# Patient Record
Sex: MALE | Race: ASIAN | NOT HISPANIC OR LATINO
[De-identification: names, ages, dates, MRNs, and addresses within clinical notes are randomized per-mention and may not be internally consistent; named-entity substitution may affect disease eponyms.]

---

## 2020-06-16 ENCOUNTER — ERX REFILL RESPONSE (OUTPATIENT)
Age: 75
End: 2020-06-16

## 2020-06-16 RX ORDER — TENOFOVIR ALAFENAMIDE 25 MG/1
TAKE 1 TABLET BY MOUTH EVERY DAY TABLET ORAL
Qty: 90 | Refills: 0

## 2020-07-31 ENCOUNTER — OFFICE VISIT (OUTPATIENT)
Dept: URBAN - METROPOLITAN AREA TELEHEALTH 2 | Facility: TELEHEALTH | Age: 75
End: 2020-07-31

## 2020-08-10 ENCOUNTER — LAB OUTSIDE AN ENCOUNTER (OUTPATIENT)
Dept: URBAN - METROPOLITAN AREA CLINIC 78 | Facility: CLINIC | Age: 75
End: 2020-08-10

## 2020-08-10 ENCOUNTER — OFFICE VISIT (OUTPATIENT)
Dept: URBAN - METROPOLITAN AREA CLINIC 78 | Facility: CLINIC | Age: 75
End: 2020-08-10
Payer: MEDICARE

## 2020-08-10 DIAGNOSIS — N18.9 CHRONIC RENAL DISEASE: ICD-10-CM

## 2020-08-10 DIAGNOSIS — B18.1 CHRONIC HEPATITIS B: ICD-10-CM

## 2020-08-10 DIAGNOSIS — K82.4 GALLBLADDER POLYP: ICD-10-CM

## 2020-08-10 PROCEDURE — 99214 OFFICE O/P EST MOD 30 MIN: CPT | Performed by: INTERNAL MEDICINE

## 2020-08-10 RX ORDER — TENOFOVIR ALAFENAMIDE 25 MG/1
TAKE 1 TABLET BY MOUTH EVERY DAY TABLET ORAL
Qty: 90 | Refills: 0

## 2020-08-10 RX ORDER — TAMSULOSIN HYDROCHLORIDE 0.4 MG/1
TAKE 1 CAPSULE (0.4 MG) BY ORAL ROUTE ONCE DAILY 1/2 HOUR FOLLOWING THE SAME MEAL EACH DAY CAPSULE ORAL 1
Qty: 0 | Refills: 0 | Status: ON HOLD | COMMUNITY
Start: 1900-01-01

## 2020-08-10 RX ORDER — TENOFOVIR ALAFENAMIDE 25 MG/1
TAKE 1 TABLET BY MOUTH EVERY DAY TABLET ORAL
Qty: 90 | Refills: 0 | Status: ON HOLD | COMMUNITY

## 2020-08-10 NOTE — HPI-TODAY'S VISIT:
Patient seen in a followup visit  He is concerned about his hand , some small discoloration and wants to now if Vemlidy is the cause  Tolerate it well  This visit he is not accompanied  his son but another adult  Last visit was a Tele visit  Overall he is doing well  Weight is stable    Colonoscopy in the chart is from 2012 by Dr. Robbie Mccabe with recommendation to repeat in 10 years  EGD with path 3/17 reviewed , takes Omeprazole  Abdominal US reviewed 9 /19 : Benign hepatic cyst ,galll bladder polyp 3 mm  ( unchanged in size )  Abd US 3/13/19 :  Known Hepatic cysts stable , GB polyp unchanges  Summarizing :Unclear what end point for treatment used. Patient reportedly seroconverted HBe Ag to Ab as per Dr Mccabe notes who was treating previously Given higher risk pt and HBs AG (+) status will continue therapy

## 2020-08-11 ENCOUNTER — TELEPHONE ENCOUNTER (OUTPATIENT)
Dept: URBAN - METROPOLITAN AREA CLINIC 92 | Facility: CLINIC | Age: 75
End: 2020-08-11

## 2020-08-12 ENCOUNTER — LAB OUTSIDE AN ENCOUNTER (OUTPATIENT)
Dept: URBAN - METROPOLITAN AREA CLINIC 78 | Facility: CLINIC | Age: 75
End: 2020-08-12

## 2020-08-12 LAB
A/G RATIO: 2
AFP, SERUM, TUMOR MARKER: 1.1
ALBUMIN: 4.5
ALKALINE PHOSPHATASE: 111
ALT (SGPT): 15
AST (SGOT): 16
BASO (ABSOLUTE): 0
BASOS: 0
BILIRUBIN, TOTAL: 0.5
BUN/CREATININE RATIO: 13
BUN: 18
CALCIUM: 9.6
CARBON DIOXIDE, TOTAL: 23
CHLORIDE: 102
CREATININE: 1.38
EGFR IF AFRICN AM: 58
EGFR IF NONAFRICN AM: 50
EOS (ABSOLUTE): 0.2
EOS: 4
GLOBULIN, TOTAL: 2.3
GLUCOSE: 89
HBSAG SCREEN: POSITIVE
HBV GENOTYPE: (no result)
HBV IU/ML: (no result)
HEMATOCRIT: 42.5
HEMATOLOGY COMMENTS:: (no result)
HEMOGLOBIN: 13.5
HEP B CORE AB, IGM: NEGATIVE
HEP B CORE AB, TOT: POSITIVE
HEP B SURFACE AB, QUAL: NON REACTIVE
HEP BE AB: POSITIVE
HEP BE AG: NEGATIVE
IMMATURE CELLS: (no result)
IMMATURE GRANS (ABS): 0
IMMATURE GRANULOCYTES: 0
LOG10 HBV AS IU/ML: (no result)
LYMPHS (ABSOLUTE): 1.4
LYMPHS: 29
MCH: 31.2
MCHC: 31.8
MCV: 98
MONOCYTES(ABSOLUTE): 0.4
MONOCYTES: 8
NEUTROPHILS (ABSOLUTE): 2.8
NEUTROPHILS: 59
NRBC: (no result)
PLATELETS: 181
POTASSIUM: 3.8
PROTEIN, TOTAL: 6.8
RBC: 4.33
RDW: 11.8
SODIUM: 140
TEST INFORMATION:: (no result)
WBC: 4.8

## 2020-12-18 ENCOUNTER — ERX REFILL RESPONSE (OUTPATIENT)
Dept: URBAN - METROPOLITAN AREA CLINIC 78 | Facility: CLINIC | Age: 75
End: 2020-12-18

## 2020-12-18 RX ORDER — TENOFOVIR ALAFENAMIDE 25 MG/1
TAKE 1 TABLET BY MOUTH EVERY DAY TABLET ORAL
Qty: 90 | Refills: 0

## 2021-01-25 ENCOUNTER — OFFICE VISIT (OUTPATIENT)
Dept: URBAN - METROPOLITAN AREA CLINIC 78 | Facility: CLINIC | Age: 76
End: 2021-01-25
Payer: MEDICARE

## 2021-01-25 VITALS
RESPIRATION RATE: 16 BRPM | HEIGHT: 63 IN | DIASTOLIC BLOOD PRESSURE: 86 MMHG | SYSTOLIC BLOOD PRESSURE: 134 MMHG | TEMPERATURE: 97.8 F | WEIGHT: 100.6 LBS | BODY MASS INDEX: 17.82 KG/M2 | HEART RATE: 74 BPM

## 2021-01-25 DIAGNOSIS — B18.1 CHRONIC HEPATITIS B: ICD-10-CM

## 2021-01-25 DIAGNOSIS — N18.9 CHRONIC RENAL DISEASE: ICD-10-CM

## 2021-01-25 DIAGNOSIS — K82.4 GALLBLADDER POLYP: ICD-10-CM

## 2021-01-25 PROCEDURE — 99213 OFFICE O/P EST LOW 20 MIN: CPT | Performed by: INTERNAL MEDICINE

## 2021-01-25 RX ORDER — TENOFOVIR ALAFENAMIDE 25 MG/1
TAKE 1 TABLET BY MOUTH EVERY DAY TABLET ORAL
Qty: 90 | Refills: 0 | Status: ACTIVE | COMMUNITY

## 2021-01-25 RX ORDER — TAMSULOSIN HYDROCHLORIDE 0.4 MG/1
TAKE 1 CAPSULE (0.4 MG) BY ORAL ROUTE ONCE DAILY 1/2 HOUR FOLLOWING THE SAME MEAL EACH DAY CAPSULE ORAL 1
Qty: 0 | Refills: 0 | Status: ON HOLD | COMMUNITY
Start: 1900-01-01

## 2021-01-25 RX ORDER — TENOFOVIR ALAFENAMIDE 25 MG/1
TAKE 1 TABLET BY MOUTH EVERY DAY TABLET ORAL
OUTPATIENT

## 2021-01-25 NOTE — HPI-TODAY'S VISIT:
Patient seen in a follow-up visit ?: Patient accompanied by son Jaspreet  Doing welll  Apetite is ok  Weight gain little  Sleep is ok  Patient has not scheduled US for unclear reasons    Labs 8/20  Hep B e Ab (+)  Hep B c Ab positibve (T)  Hep B c IgM neg  Hep B s Ag neg      Colonoscopy in the chart is from 2012 by Dr. Robbie Mccabe with recommendation to repeat in 10 years EGD with path 3/17 reviewed , takes Omeprazole  Abdominal US reviewed 9 /19 : Benign hepatic cyst ,galll bladder polyp 3 mm  ( unchanged in size )  Abd US 3/13/19 :  Known Hepatic cysts stable , GB polyp unchanges  Summarizing :Unclear what end point for treatment used. Patient reportedly seroconverted HBe Ag to Ab as per Dr Mccabe notes who was treating previously Given higher risk pt and HBs AG (+) status will continue therapy

## 2021-02-01 ENCOUNTER — LAB OUTSIDE AN ENCOUNTER (OUTPATIENT)
Dept: URBAN - METROPOLITAN AREA CLINIC 78 | Facility: CLINIC | Age: 76
End: 2021-02-01

## 2021-02-17 ENCOUNTER — OFFICE VISIT (OUTPATIENT)
Dept: URBAN - METROPOLITAN AREA CLINIC 77 | Facility: CLINIC | Age: 76
End: 2021-02-17
Payer: MEDICARE

## 2021-02-17 DIAGNOSIS — K82.4 GALLBLADDER POLYP: ICD-10-CM

## 2021-02-17 DIAGNOSIS — K76.89 LIVER CYSTS: ICD-10-CM

## 2021-02-17 PROCEDURE — 76705 ECHO EXAM OF ABDOMEN: CPT | Performed by: INTERNAL MEDICINE

## 2021-02-17 RX ORDER — TAMSULOSIN HYDROCHLORIDE 0.4 MG/1
TAKE 1 CAPSULE (0.4 MG) BY ORAL ROUTE ONCE DAILY 1/2 HOUR FOLLOWING THE SAME MEAL EACH DAY CAPSULE ORAL 1
Qty: 0 | Refills: 0 | Status: ON HOLD | COMMUNITY
Start: 1900-01-01

## 2021-02-19 LAB
A/G RATIO: 1.8
AFP, SERUM, TUMOR MARKER: 1.2
ALBUMIN: 4.2
ALKALINE PHOSPHATASE: 110
ALT (SGPT): 17
AST (SGOT): 18
BILIRUBIN, TOTAL: 0.4
BUN/CREATININE RATIO: 13
BUN: 18
CALCIUM: 9.1
CARBON DIOXIDE, TOTAL: 25
CHLORIDE: 104
CREATININE: 1.38
EGFR IF AFRICN AM: 58
EGFR IF NONAFRICN AM: 50
GLOBULIN, TOTAL: 2.4
GLUCOSE: 100
HBSAG CONFIRMATION: POSITIVE
HBSAG SCREEN: (no result)
POTASSIUM: 4.3
PROTEIN, TOTAL: 6.6
SODIUM: 140

## 2021-03-22 ENCOUNTER — LAB OUTSIDE AN ENCOUNTER (OUTPATIENT)
Dept: URBAN - METROPOLITAN AREA CLINIC 78 | Facility: CLINIC | Age: 76
End: 2021-03-22

## 2021-03-22 ENCOUNTER — OFFICE VISIT (OUTPATIENT)
Dept: URBAN - METROPOLITAN AREA CLINIC 78 | Facility: CLINIC | Age: 76
End: 2021-03-22
Payer: MEDICARE

## 2021-03-22 VITALS
WEIGHT: 102.2 LBS | HEART RATE: 77 BPM | RESPIRATION RATE: 16 BRPM | BODY MASS INDEX: 18.11 KG/M2 | HEIGHT: 63 IN | SYSTOLIC BLOOD PRESSURE: 105 MMHG | DIASTOLIC BLOOD PRESSURE: 62 MMHG | TEMPERATURE: 99.2 F

## 2021-03-22 DIAGNOSIS — B18.1 CHRONIC HEPATITIS B: ICD-10-CM

## 2021-03-22 DIAGNOSIS — K82.4 GALLBLADDER POLYP: ICD-10-CM

## 2021-03-22 DIAGNOSIS — N18.9 CHRONIC RENAL DISEASE: ICD-10-CM

## 2021-03-22 PROBLEM — 709044004 CHRONIC RENAL DISEASE: Status: ACTIVE | Noted: 2021-03-22

## 2021-03-22 PROCEDURE — 99213 OFFICE O/P EST LOW 20 MIN: CPT | Performed by: INTERNAL MEDICINE

## 2021-03-22 RX ORDER — TAMSULOSIN HYDROCHLORIDE 0.4 MG/1
TAKE 1 CAPSULE (0.4 MG) BY ORAL ROUTE ONCE DAILY 1/2 HOUR FOLLOWING THE SAME MEAL EACH DAY CAPSULE ORAL 1
Qty: 0 | Refills: 0 | Status: ACTIVE | COMMUNITY
Start: 1900-01-01

## 2021-03-22 NOTE — HPI-TODAY'S VISIT:
Patient seen in a follow-up visit  Patient accompanied by son Jaspreet  Doing well  Appetite is ok  Weight gain little  Sleep is ok  He had questions about resuming Viread    Labs 8/20  Hep B e Ab (+)  Hep B c Ab positive (T)  Hep B c IgM neg  Hep B s Ag neg      Colonoscopy in the chart is from 2012 by Dr. Robbie Mccabe with recommendation to repeat in 10 years EGD with path 3/17 reviewed, takes Omeprazole  Abdominal US reviewed 2/21 :  Benign hepatic cyst, all bladder polyp 3 mm  (unchanged)  Abd US 3/13/19 :  Known Hepatic cysts stable, GB polyp unchanged  Summarizing :Unclear what end point for treatment used. Patient reportedly seroconverted HBe Ag to Ab as per Dr Mccabe notes who was treating previously Given higher risk pt and HBs AG (+) status will continue therapy

## 2021-04-02 ENCOUNTER — TELEPHONE ENCOUNTER (OUTPATIENT)
Dept: URBAN - METROPOLITAN AREA CLINIC 78 | Facility: CLINIC | Age: 76
End: 2021-04-02

## 2021-04-02 RX ORDER — TENOFOVIR DISOPROXIL FUMARATE 300 MG/1
TAKE 1 TABLET (300 MG) BY ORAL ROUTE ONCE DAILY WITH A MEAL FOR 90 DAYS TABLET ORAL 1
Qty: 90 | Refills: 1 | OUTPATIENT
Start: 2021-04-02

## 2021-04-28 ENCOUNTER — TELEPHONE ENCOUNTER (OUTPATIENT)
Dept: URBAN - METROPOLITAN AREA CLINIC 78 | Facility: CLINIC | Age: 76
End: 2021-04-28

## 2021-05-12 ENCOUNTER — OFFICE VISIT (OUTPATIENT)
Dept: URBAN - METROPOLITAN AREA CLINIC 77 | Facility: CLINIC | Age: 76
End: 2021-05-12
Payer: MEDICARE

## 2021-05-12 DIAGNOSIS — K82.4 GALLBLADDER POLYP: ICD-10-CM

## 2021-05-12 DIAGNOSIS — K76.89 LIVER CYST: ICD-10-CM

## 2021-05-12 DIAGNOSIS — B18.2 CARRIER OF VIRAL HEPATITIS C: ICD-10-CM

## 2021-05-12 PROCEDURE — 76705 ECHO EXAM OF ABDOMEN: CPT | Performed by: INTERNAL MEDICINE

## 2021-05-12 RX ORDER — TENOFOVIR DISOPROXIL FUMARATE 300 MG/1
TAKE 1 TABLET (300 MG) BY ORAL ROUTE ONCE DAILY WITH A MEAL FOR 90 DAYS TABLET ORAL 1
Qty: 90 | Refills: 1 | Status: ACTIVE | COMMUNITY
Start: 2021-04-02

## 2021-05-12 RX ORDER — TAMSULOSIN HYDROCHLORIDE 0.4 MG/1
TAKE 1 CAPSULE (0.4 MG) BY ORAL ROUTE ONCE DAILY 1/2 HOUR FOLLOWING THE SAME MEAL EACH DAY CAPSULE ORAL 1
Qty: 0 | Refills: 0 | Status: ACTIVE | COMMUNITY
Start: 1900-01-01

## 2021-05-16 LAB
AFP, SERUM, TUMOR MARKER: 1.4
HBV LOG10: (no result)
HEPATITIS B QUANTITATION: (no result)
TEST INFORMATION:: (no result)

## 2021-07-19 ENCOUNTER — OFFICE VISIT (OUTPATIENT)
Dept: URBAN - METROPOLITAN AREA CLINIC 78 | Facility: CLINIC | Age: 76
End: 2021-07-19
Payer: MEDICARE

## 2021-07-19 VITALS
HEART RATE: 62 BPM | HEIGHT: 63 IN | WEIGHT: 96.2 LBS | BODY MASS INDEX: 17.05 KG/M2 | DIASTOLIC BLOOD PRESSURE: 83 MMHG | TEMPERATURE: 98.4 F | SYSTOLIC BLOOD PRESSURE: 133 MMHG

## 2021-07-19 DIAGNOSIS — B18.1 CHRONIC HEPATITIS B: ICD-10-CM

## 2021-07-19 DIAGNOSIS — K82.4 GALLBLADDER POLYP: ICD-10-CM

## 2021-07-19 DIAGNOSIS — Z12.11 SCREEN FOR COLON CANCER: ICD-10-CM

## 2021-07-19 DIAGNOSIS — N28.9 RENAL INSUFFICIENCY: ICD-10-CM

## 2021-07-19 PROCEDURE — 99213 OFFICE O/P EST LOW 20 MIN: CPT | Performed by: INTERNAL MEDICINE

## 2021-07-19 RX ORDER — TENOFOVIR DISOPROXIL FUMARATE 300 MG/1
TAKE 1 TABLET (300 MG) BY ORAL ROUTE ONCE DAILY WITH A MEAL FOR 90 DAYS TABLET ORAL 1
Qty: 90 | Refills: 1 | Status: ACTIVE | COMMUNITY
Start: 2021-04-02

## 2021-07-19 RX ORDER — TAMSULOSIN HYDROCHLORIDE 0.4 MG/1
TAKE 1 CAPSULE (0.4 MG) BY ORAL ROUTE ONCE DAILY 1/2 HOUR FOLLOWING THE SAME MEAL EACH DAY CAPSULE ORAL 1
Qty: 0 | Refills: 0 | Status: ACTIVE | COMMUNITY
Start: 1900-01-01

## 2021-07-19 NOTE — HPI-TODAY'S VISIT:
Patient seen in a follow-up visit  Patient accompanied by son Jaspreet  Doing well  Appetite is ok  Weight gain lstable Sleep is ok  He is on Tenofivir    Labs 5/21 HBV quant is undetectible  AFP 1.4   Previous labs 8/20 Hep B e Ab (+)  Hep B c Ab positive (T)  Hep B c IgM neg  Hep B s Ag neg   RUQ US 5/21  STable GB polyps and Hepatic cyts   Renal insufficiency under care of Dr Faust    Colonoscopy in the chart is from 2012 by Dr. Robbie Mccabe with recommendation to repeat in 10 years EGD with path 3/17 reviewed, takes Omeprazole  Abdominal US reviewed 2/21 :  Benign hepatic cyst, all bladder polyp 3 mm  (unchanged)  Abd US 3/13/19 :  Known Hepatic cysts stable, GB polyp unchanged  Summarizing :Unclear what end point for treatment used. Patient reportedly seroconverted HBe Ag to Ab as per Dr Mccabe notes who was treating previously Given higher risk pt and HBs AG (+) status will continue therapy

## 2021-10-07 ENCOUNTER — TELEPHONE ENCOUNTER (OUTPATIENT)
Dept: URBAN - METROPOLITAN AREA CLINIC 78 | Facility: CLINIC | Age: 76
End: 2021-10-07

## 2021-10-07 RX ORDER — TENOFOVIR DISOPROXIL FUMARATE 300 MG/1
TAKE 1 TABLET (300 MG) BY ORAL ROUTE ONCE DAILY WITH A MEAL FOR 90 DAYS TABLET ORAL 1
Qty: 90 | Refills: 1
Start: 2021-04-02

## 2021-11-15 ENCOUNTER — LAB OUTSIDE AN ENCOUNTER (OUTPATIENT)
Dept: URBAN - METROPOLITAN AREA CLINIC 78 | Facility: CLINIC | Age: 76
End: 2021-11-15

## 2021-11-16 ENCOUNTER — LAB OUTSIDE AN ENCOUNTER (OUTPATIENT)
Dept: URBAN - METROPOLITAN AREA CLINIC 78 | Facility: CLINIC | Age: 76
End: 2021-11-16

## 2021-11-16 LAB
AFP, SERUM, TUMOR MARKER: 1.9
HEPATITIS B SURF AB QUANT: <3.1

## 2022-01-15 ENCOUNTER — LAB OUTSIDE AN ENCOUNTER (OUTPATIENT)
Dept: URBAN - METROPOLITAN AREA CLINIC 78 | Facility: CLINIC | Age: 77
End: 2022-01-15

## 2022-01-19 ENCOUNTER — OFFICE VISIT (OUTPATIENT)
Dept: URBAN - METROPOLITAN AREA CLINIC 77 | Facility: CLINIC | Age: 77
End: 2022-01-19
Payer: MEDICARE

## 2022-01-19 ENCOUNTER — LAB OUTSIDE AN ENCOUNTER (OUTPATIENT)
Dept: URBAN - METROPOLITAN AREA CLINIC 78 | Facility: CLINIC | Age: 77
End: 2022-01-19

## 2022-01-19 DIAGNOSIS — B18.1 CARRIER OF HEPATITIS B: ICD-10-CM

## 2022-01-19 DIAGNOSIS — K82.4 GALLBLADDER POLYP: ICD-10-CM

## 2022-01-19 DIAGNOSIS — K76.89 HEPATIC CYST: ICD-10-CM

## 2022-01-19 PROCEDURE — 76705 ECHO EXAM OF ABDOMEN: CPT | Performed by: INTERNAL MEDICINE

## 2022-01-19 RX ORDER — TAMSULOSIN HYDROCHLORIDE 0.4 MG/1
TAKE 1 CAPSULE (0.4 MG) BY ORAL ROUTE ONCE DAILY 1/2 HOUR FOLLOWING THE SAME MEAL EACH DAY CAPSULE ORAL 1
Qty: 0 | Refills: 0 | Status: ACTIVE | COMMUNITY
Start: 1900-01-01

## 2022-01-19 RX ORDER — TENOFOVIR DISOPROXIL FUMARATE 300 MG/1
TAKE 1 TABLET (300 MG) BY ORAL ROUTE ONCE DAILY WITH A MEAL FOR 90 DAYS TABLET ORAL 1
Qty: 90 | Refills: 1 | Status: ACTIVE | COMMUNITY
Start: 2021-04-02

## 2022-01-20 LAB — HEPATITIS B SURF AB QUANT: <3.1

## 2022-01-26 ENCOUNTER — OFFICE VISIT (OUTPATIENT)
Dept: URBAN - METROPOLITAN AREA CLINIC 78 | Facility: CLINIC | Age: 77
End: 2022-01-26
Payer: MEDICARE

## 2022-01-26 VITALS
WEIGHT: 92.4 LBS | DIASTOLIC BLOOD PRESSURE: 84 MMHG | HEIGHT: 63 IN | RESPIRATION RATE: 16 BRPM | BODY MASS INDEX: 16.37 KG/M2 | TEMPERATURE: 98.1 F | SYSTOLIC BLOOD PRESSURE: 127 MMHG | HEART RATE: 71 BPM

## 2022-01-26 DIAGNOSIS — K82.4 GALLBLADDER POLYP: ICD-10-CM

## 2022-01-26 DIAGNOSIS — Z12.11 SCREEN FOR COLON CANCER: ICD-10-CM

## 2022-01-26 DIAGNOSIS — N28.9 RENAL INSUFFICIENCY: ICD-10-CM

## 2022-01-26 DIAGNOSIS — B18.1 CHRONIC HEPATITIS B: ICD-10-CM

## 2022-01-26 DIAGNOSIS — R63.4 WEIGHT LOSS: ICD-10-CM

## 2022-01-26 PROCEDURE — 99214 OFFICE O/P EST MOD 30 MIN: CPT | Performed by: INTERNAL MEDICINE

## 2022-01-26 RX ORDER — SODIUM PICOSULFATE, MAGNESIUM OXIDE, AND ANHYDROUS CITRIC ACID 10; 3.5; 12 MG/160ML; G/160ML; G/160ML
160 ML LIQUID ORAL
Qty: 320 MILLILITER | Refills: 0 | OUTPATIENT
Start: 2022-01-26 | End: 2022-01-27

## 2022-01-26 RX ORDER — TENOFOVIR DISOPROXIL FUMARATE 300 MG/1
TAKE 1 TABLET (300 MG) BY ORAL ROUTE ONCE DAILY WITH A MEAL FOR 90 DAYS TABLET ORAL 1
Qty: 90 | Refills: 1 | Status: ACTIVE | COMMUNITY
Start: 2021-04-02

## 2022-01-26 RX ORDER — TAMSULOSIN HYDROCHLORIDE 0.4 MG/1
TAKE 1 CAPSULE (0.4 MG) BY ORAL ROUTE ONCE DAILY 1/2 HOUR FOLLOWING THE SAME MEAL EACH DAY CAPSULE ORAL 1
Qty: 0 | Refills: 0 | Status: ACTIVE | COMMUNITY
Start: 1900-01-01

## 2022-01-26 NOTE — HPI-TODAY'S VISIT:
Patient seen in a follow-up visit  Patient accompanied by son Jaspreet  Doing well  Appetite is ok  Weight loss of few pounds since last visit  He is on Tenofivir  Labs 1/22 :  Hep B s AB undetectible  Hep B PCR not done   Labs from 5/21  HBV quant negative Hep B s Ag positive   RUQ US Jan 2022: Stable hepatic cyst  No lesions noted    PREVIOUS ENCOUNTER: Labs 5/21  HBV quant is undetectible  AFP 1.4   Previous labs 8/20 Hep B e Ag (-)  Hep B e Ab (+)  Hep B c Ab positive (T)  Hep B c IgM neg  Hep B s Ag positive  VL undetectible   RUQ US 5/21  STable GB polyps and Hepatic cyts   Renal insufficiency under care of Dr Faust    Colonoscopy in the chart is from 2012 by Dr. Robbie Mccabe with recommendation to repeat in 10 years EGD with path 3/17 reviewed, takes Omeprazole  Abdominal US reviewed 2/21 :  Benign hepatic cyst, all bladder polyp 3 mm  (unchanged)  Abd US 3/13/19 :  Known Hepatic cysts stable, GB polyp unchanged  Summarizing :Unclear what end point for treatment used. Patient reportedly seroconverted HBe Ag to Ab as per Dr Mccabe notes who was treating previously Given higher risk pt and HBs AG (+) status will continue therapy

## 2022-02-16 ENCOUNTER — LAB OUTSIDE AN ENCOUNTER (OUTPATIENT)
Dept: URBAN - METROPOLITAN AREA CLINIC 78 | Facility: CLINIC | Age: 77
End: 2022-02-16

## 2022-02-17 LAB — AFP, SERUM, TUMOR MARKER: 1.3

## 2022-02-24 ENCOUNTER — OFFICE VISIT (OUTPATIENT)
Dept: URBAN - METROPOLITAN AREA SURGERY CENTER 15 | Facility: SURGERY CENTER | Age: 77
End: 2022-02-24
Payer: MEDICARE

## 2022-02-24 DIAGNOSIS — Z12.11 AVERAGE RISK FOR CRC. DUE TO PT'S CO-MORBID STATE WITH END STAGE DEMENTIA, HIGH RISK FOR ANESTHESIA (PER NEUROLOGY); INABILITY TO TAKE A BOWEL PREP....WOULD NOT ADVISE ANY COLORECTAL CANCER SCREENING INCLUDING STOOL TEST FOR FECAL BLOOD.: ICD-10-CM

## 2022-02-24 PROCEDURE — G0121 COLON CA SCRN NOT HI RSK IND: HCPCS | Performed by: INTERNAL MEDICINE

## 2022-02-24 PROCEDURE — G8907 PT DOC NO EVENTS ON DISCHARG: HCPCS | Performed by: INTERNAL MEDICINE

## 2022-04-18 ENCOUNTER — TELEPHONE ENCOUNTER (OUTPATIENT)
Dept: URBAN - METROPOLITAN AREA CLINIC 78 | Facility: CLINIC | Age: 77
End: 2022-04-18

## 2022-04-18 RX ORDER — TENOFOVIR DISOPROXIL FUMARATE 300 MG/1
TAKE 1 TABLET (300 MG) BY ORAL ROUTE ONCE DAILY WITH A MEAL FOR 90 DAYS TABLET ORAL 1
Qty: 90 | Refills: 1
Start: 2021-04-02

## 2022-07-14 ENCOUNTER — LAB OUTSIDE AN ENCOUNTER (OUTPATIENT)
Dept: URBAN - METROPOLITAN AREA CLINIC 78 | Facility: CLINIC | Age: 77
End: 2022-07-14

## 2022-08-05 ENCOUNTER — OFFICE VISIT (OUTPATIENT)
Dept: URBAN - METROPOLITAN AREA CLINIC 78 | Facility: CLINIC | Age: 77
End: 2022-08-05
Payer: MEDICARE

## 2022-08-05 VITALS
HEIGHT: 63 IN | SYSTOLIC BLOOD PRESSURE: 130 MMHG | TEMPERATURE: 98.4 F | RESPIRATION RATE: 16 BRPM | DIASTOLIC BLOOD PRESSURE: 77 MMHG | HEART RATE: 73 BPM | WEIGHT: 90.4 LBS | BODY MASS INDEX: 16.02 KG/M2

## 2022-08-05 DIAGNOSIS — R63.4 WEIGHT LOSS: ICD-10-CM

## 2022-08-05 DIAGNOSIS — N28.9 RENAL INSUFFICIENCY: ICD-10-CM

## 2022-08-05 DIAGNOSIS — K82.4 GALLBLADDER POLYP: ICD-10-CM

## 2022-08-05 DIAGNOSIS — B18.1 CHRONIC HEPATITIS B: ICD-10-CM

## 2022-08-05 PROCEDURE — 99213 OFFICE O/P EST LOW 20 MIN: CPT | Performed by: INTERNAL MEDICINE

## 2022-08-05 RX ORDER — TENOFOVIR DISOPROXIL FUMARATE 300 MG/1
TAKE 1 TABLET (300 MG) BY ORAL ROUTE ONCE DAILY WITH A MEAL FOR 90 DAYS TABLET ORAL 1
Qty: 90 | Refills: 1 | Status: ACTIVE | COMMUNITY
Start: 2021-04-02

## 2022-08-05 RX ORDER — TENOFOVIR DISOPROXIL FUMARATE 300 MG/1
TAKE 1 TABLET (300 MG) BY ORAL ROUTE ONCE DAILY WITH A MEAL FOR 90 DAYS TABLET ORAL 1
Qty: 90 | Refills: 1
Start: 2021-04-02

## 2022-08-05 RX ORDER — TAMSULOSIN HYDROCHLORIDE 0.4 MG/1
TAKE 1 CAPSULE (0.4 MG) BY ORAL ROUTE ONCE DAILY 1/2 HOUR FOLLOWING THE SAME MEAL EACH DAY CAPSULE ORAL 1
Qty: 0 | Refills: 0 | Status: ACTIVE | COMMUNITY
Start: 1900-01-01

## 2022-08-05 NOTE — HPI-TODAY'S VISIT:
Patient seen in a follow-up visit  Patient accompanied by son Cayetano  today  Doing well  Appetite is ok  Denies nausea/vomiting or dysphagia  Denies bleeding  He reports periodic heartburn , food does not  get stuck .Deneis worseing of his symptoms  He takes Famotidine q 2-3 days  Weight loss of few pounds since last visit  He is on Tenofivir  requesting refill and US   MRI  was declined because of reported metal in left shoulder and left  hip  Denies having CT scan done   Back pain s/p injection and needing  heating pads    Colonoscopy : Normal 2022   EGD 2017 : LA grade B esophagitis  No HP   Barium Swallow 2020 by his ENT : Mild esophageal dysmotility  Mild reflux     Labs 1/22 :  Hep B s AB undetectible  Hep B PCR not done   Labs from 5/21  HBV quant negative Hep B s Ag positive   RUQ US Jan 2022: Stable hepatic cyst  No lesions noted    PREVIOUS ENCOUNTER: Labs 5/21  HBV quant is undetectible  AFP 1.4   Previous labs 8/20 Hep B e Ag (-)  Hep B e Ab (+)  Hep B c Ab positive (T)  Hep B c IgM neg  Hep B s Ag positive  VL undetectible   RUQ US 5/21  STable GB polyps and Hepatic cyts   Renal insufficiency under care of Dr Faust/ Dr Pepe    Historical data : Colonoscopy in the chart is from 2012 by Dr. Robbie Mccabe with recommendation to repeat in 10 years EGD with path 3/17 reviewed, takes Omeprazole  Abdominal US reviewed 2/21 :  Benign hepatic cyst, all bladder polyp 3 mm  (unchanged)  Abd US 3/13/19 :  Known Hepatic cysts stable, GB polyp unchanged  Summarizing :Unclear what end point for treatment used. Patient reportedly seroconverted HBe Ag to Ab as per Dr Mccabe notes who was treating previously Given higher risk pt and HBs AG (+) status will continue therapy

## 2022-08-17 ENCOUNTER — OFFICE VISIT (OUTPATIENT)
Dept: URBAN - METROPOLITAN AREA CLINIC 77 | Facility: CLINIC | Age: 77
End: 2022-08-17
Payer: MEDICARE

## 2022-08-17 DIAGNOSIS — K76.89 HEPATIC CYST: ICD-10-CM

## 2022-08-17 DIAGNOSIS — B18.1 CHRONIC HEPATITIS B: ICD-10-CM

## 2022-08-17 DIAGNOSIS — K82.4 GALLBLADDER POLYP: ICD-10-CM

## 2022-08-17 PROCEDURE — 76700 US EXAM ABDOM COMPLETE: CPT | Performed by: INTERNAL MEDICINE

## 2022-08-17 RX ORDER — TAMSULOSIN HYDROCHLORIDE 0.4 MG/1
TAKE 1 CAPSULE (0.4 MG) BY ORAL ROUTE ONCE DAILY 1/2 HOUR FOLLOWING THE SAME MEAL EACH DAY CAPSULE ORAL 1
Qty: 0 | Refills: 0 | Status: ACTIVE | COMMUNITY
Start: 1900-01-01

## 2022-08-17 RX ORDER — TENOFOVIR DISOPROXIL FUMARATE 300 MG/1
TAKE 1 TABLET (300 MG) BY ORAL ROUTE ONCE DAILY WITH A MEAL FOR 90 DAYS TABLET ORAL 1
Qty: 90 | Refills: 1 | Status: ACTIVE | COMMUNITY
Start: 2021-04-02

## 2022-08-19 PROBLEM — 85057007 HEPATIC CYST: Status: ACTIVE | Noted: 2022-08-19

## 2022-09-21 ENCOUNTER — OFFICE VISIT (OUTPATIENT)
Dept: URBAN - METROPOLITAN AREA CLINIC 78 | Facility: CLINIC | Age: 77
End: 2022-09-21
Payer: MEDICARE

## 2022-09-21 VITALS
WEIGHT: 87.8 LBS | SYSTOLIC BLOOD PRESSURE: 122 MMHG | HEIGHT: 63 IN | TEMPERATURE: 98 F | BODY MASS INDEX: 15.56 KG/M2 | DIASTOLIC BLOOD PRESSURE: 76 MMHG | HEART RATE: 73 BPM

## 2022-09-21 DIAGNOSIS — R79.89 ELEVATED LFTS: ICD-10-CM

## 2022-09-21 DIAGNOSIS — G89.29 OTHER CHRONIC PAIN: ICD-10-CM

## 2022-09-21 DIAGNOSIS — R63.4 WEIGHT LOSS: ICD-10-CM

## 2022-09-21 DIAGNOSIS — M54.9 DORSALGIA, UNSPECIFIED: ICD-10-CM

## 2022-09-21 DIAGNOSIS — B18.1 CHRONIC HEPATITIS B: ICD-10-CM

## 2022-09-21 PROCEDURE — 99213 OFFICE O/P EST LOW 20 MIN: CPT | Performed by: INTERNAL MEDICINE

## 2022-09-21 RX ORDER — TAMSULOSIN HYDROCHLORIDE 0.4 MG/1
TAKE 1 CAPSULE (0.4 MG) BY ORAL ROUTE ONCE DAILY 1/2 HOUR FOLLOWING THE SAME MEAL EACH DAY CAPSULE ORAL 1
Qty: 0 | Refills: 0 | Status: ACTIVE | COMMUNITY
Start: 1900-01-01

## 2022-09-21 RX ORDER — TENOFOVIR DISOPROXIL FUMARATE 300 MG/1
TAKE 1 TABLET (300 MG) BY ORAL ROUTE ONCE DAILY WITH A MEAL FOR 90 DAYS TABLET ORAL 1
Qty: 90 | Refills: 1 | Status: ACTIVE | COMMUNITY
Start: 2021-04-02

## 2022-09-21 RX ORDER — TENOFOVIR DISOPROXIL FUMARATE 300 MG/1
TAKE 1 TABLET (300 MG) BY ORAL ROUTE ONCE DAILY WITH A MEAL FOR 90 DAYS TABLET ORAL 1
Qty: 90 | Refills: 1

## 2022-09-21 NOTE — HPI-TODAY'S VISIT:
Patient seen in a follow-up visit  Patient accompanied by son Cayetano  today  Doing so so  Reports back pain ( had declined surgery ) .Takes tramadol and calcium  Appetite is ok  Denies nausea/vomiting or dysphagia  Denies bleeding  He reports periodic heartburn , food does not  get stuck .Deneis worseing of his symptoms  He takes Famotidine q 2-3 days  Weight loss of few pounds since last visit  He is on Tenofivir  requesting refill and US   MRI  was declined because of reported metal in left shoulder and left  hip  Denies having CT scan done   Back pain s/p injection and needing  heating pads    Colonoscopy : Normal 2022   EGD 2017 : LA grade B esophagitis  No HP   Barium Swallow 2020 by his ENT : Mild esophageal dysmotility  Mild reflux     Labs 1/22 :  Hep B s AB undetectible  Hep B PCR not done   Labs from 5/21  HBV quant negative Hep B s Ag positive   RUQ US Jan 2022: Stable hepatic cyst  No lesions noted    PREVIOUS ENCOUNTER: Labs 5/21  HBV quant is undetectible  AFP 1.4   Previous labs 8/20 Hep B e Ag (-)  Hep B e Ab (+)  Hep B c Ab positive (T)  Hep B c IgM neg  Hep B s Ag positive  VL undetectible   RUQ US 5/21  STable GB polyps and Hepatic cyts   Renal insufficiency under care of Dr Faust/ Dr Pepe    Historical data : Colonoscopy in the chart is from 2012 by Dr. Robbie Mccabe with recommendation to repeat in 10 years EGD with path 3/17 reviewed, takes Omeprazole  Abdominal US reviewed 2/21 :  Benign hepatic cyst, all bladder polyp 3 mm  (unchanged)  Abd US 3/13/19 :  Known Hepatic cysts stable, GB polyp unchanged  Summarizing :Unclear what end point for treatment used. Patient reportedly seroconverted HBe Ag to Ab as per Dr Mccabe notes who was treating previously Given higher risk pt and HBs AG (+) status will continue therapy

## 2022-10-31 ENCOUNTER — LAB OUTSIDE AN ENCOUNTER (OUTPATIENT)
Dept: URBAN - METROPOLITAN AREA CLINIC 78 | Facility: CLINIC | Age: 77
End: 2022-10-31

## 2022-10-31 ENCOUNTER — TELEPHONE ENCOUNTER (OUTPATIENT)
Dept: URBAN - METROPOLITAN AREA CLINIC 78 | Facility: CLINIC | Age: 77
End: 2022-10-31

## 2023-01-10 ENCOUNTER — LAB OUTSIDE AN ENCOUNTER (OUTPATIENT)
Dept: URBAN - METROPOLITAN AREA CLINIC 78 | Facility: CLINIC | Age: 78
End: 2023-01-10

## 2023-01-23 ENCOUNTER — OFFICE VISIT (OUTPATIENT)
Dept: URBAN - METROPOLITAN AREA CLINIC 78 | Facility: CLINIC | Age: 78
End: 2023-01-23
Payer: MEDICARE

## 2023-01-23 VITALS
WEIGHT: 91 LBS | DIASTOLIC BLOOD PRESSURE: 79 MMHG | BODY MASS INDEX: 16.12 KG/M2 | SYSTOLIC BLOOD PRESSURE: 138 MMHG | HEIGHT: 63 IN | TEMPERATURE: 97.9 F | HEART RATE: 71 BPM

## 2023-01-23 DIAGNOSIS — R80.9 PROTEINURIA, UNSPECIFIED TYPE: ICD-10-CM

## 2023-01-23 DIAGNOSIS — G89.29 OTHER CHRONIC PAIN: ICD-10-CM

## 2023-01-23 DIAGNOSIS — B18.1 CHRONIC HEPATITIS B: ICD-10-CM

## 2023-01-23 PROBLEM — 82423001: Status: ACTIVE | Noted: 2022-09-21

## 2023-01-23 PROBLEM — 61977001 CHRONIC TYPE B VIRAL HEPATITIS: Status: ACTIVE | Noted: 2021-03-22

## 2023-01-23 PROCEDURE — 99213 OFFICE O/P EST LOW 20 MIN: CPT | Performed by: INTERNAL MEDICINE

## 2023-01-23 RX ORDER — TENOFOVIR DISOPROXIL FUMARATE 300 MG/1
TAKE 1 TABLET (300 MG) BY ORAL ROUTE ONCE DAILY WITH A MEAL FOR 90 DAYS TABLET ORAL 1
Qty: 90 | Refills: 1 | Status: ACTIVE | COMMUNITY

## 2023-01-23 RX ORDER — TENOFOVIR DISOPROXIL FUMARATE 300 MG/1
TAKE 1 TABLET (300 MG) BY ORAL ROUTE ONCE DAILY WITH A MEAL FOR 90 DAYS TABLET ORAL 1
Qty: 90 | Refills: 1

## 2023-01-23 RX ORDER — TAMSULOSIN HYDROCHLORIDE 0.4 MG/1
TAKE 1 CAPSULE (0.4 MG) BY ORAL ROUTE ONCE DAILY 1/2 HOUR FOLLOWING THE SAME MEAL EACH DAY CAPSULE ORAL 1
Qty: 0 | Refills: 0 | Status: ACTIVE | COMMUNITY
Start: 1900-01-01

## 2023-01-23 NOTE — HPI-TODAY'S VISIT:
Patient seen in a follow-up visit  Patient accompanied by son Cayetano  today  . Patient is up post CT scan of abdomen done without contrast.  It is performed at [facility] dated 10/31/2022 the liver reveals multiple cysts involving all segments of the liver with numerous hypodensities that are too small to fully characterize like to get more cysts evaluation for solid hepatic masses is considerably limited in the absence of intravenous contrast as well as the background of cystic liver lesions stomach is distended with moderate stool burden in the ascending colon partially imaged no bulky lymph nodes no reported Lab dated 9/23/22 :  AFP < 0.9  Hepatitis DNA not detected   HBeAb  Reactive Hepatitis B e Ag Negative  Hep B s Ag  reactive  Previously he declined with contrast  because of kidney proteinuria   He reports metal in his left shoulder and left hip with pain Denies nausea/vomiting or dysphagia  Denies bleeding  He reports periodic heartburn , food does not  get stuck .Deneis worseing of his symptoms  He takes Famotidine q 2-3 days  Weight loss of few pounds since last visit  He is on Tenofivir  requesting refill and US   MRI  was declined because of reported metal in left shoulder and left  hip  Denies having CT scan done   Back pain s/p injection and needing  heating pads    Colonoscopy : Normal 2022   EGD 2017 : LA grade B esophagitis  No HP   Barium Swallow 2020 by his ENT : Mild esophageal dysmotility  Mild reflux     Labs 1/22 :  Hep B s AB undetectible  Hep B PCR not done   Labs from 5/21  HBV quant negative Hep B s Ag positive   RUQ US Jan 2022: Stable hepatic cyst  No lesions noted    PREVIOUS ENCOUNTER: Labs 5/21  HBV quant is undetectible  AFP 1.4   Previous labs 8/20 Hep B e Ag (-)  Hep B e Ab (+)  Hep B c Ab positive (T)  Hep B c IgM neg  Hep B s Ag positive  VL undetectible   RUQ US 5/21  STable GB polyps and Hepatic cyts   Renal insufficiency under care of Dr Faust/ Dr Pepe    Historical data : Colonoscopy in the chart is from 2012 by Dr. Robbie Mccabe with recommendation to repeat in 10 years EGD with path 3/17 reviewed, takes Omeprazole  Abdominal US reviewed 2/21 :  Benign hepatic cyst, all bladder polyp 3 mm  (unchanged)  Abd US 3/13/19 :  Known Hepatic cysts stable, GB polyp unchanged  Summarizing :Unclear what end point for treatment used. Patient reportedly seroconverted HBe Ag to Ab as per Dr Mccabe notes who was treating previously Given higher risk pt and HBs AG (+) status will continue therapy

## 2023-05-02 ENCOUNTER — TELEPHONE ENCOUNTER (OUTPATIENT)
Dept: URBAN - METROPOLITAN AREA CLINIC 78 | Facility: CLINIC | Age: 78
End: 2023-05-02

## 2023-05-02 RX ORDER — TENOFOVIR DISOPROXIL FUMARATE 300 MG/1
TAKE 1 TABLET (300 MG) BY ORAL ROUTE ONCE DAILY WITH A MEAL FOR 90 DAYS TABLET ORAL 1
Qty: 90 | Refills: 3

## 2023-06-26 ENCOUNTER — OFFICE VISIT (OUTPATIENT)
Dept: URBAN - METROPOLITAN AREA CLINIC 78 | Facility: CLINIC | Age: 78
End: 2023-06-26
Payer: MEDICARE

## 2023-06-26 VITALS
BODY MASS INDEX: 16.12 KG/M2 | HEART RATE: 77 BPM | SYSTOLIC BLOOD PRESSURE: 108 MMHG | RESPIRATION RATE: 16 BRPM | TEMPERATURE: 98.1 F | DIASTOLIC BLOOD PRESSURE: 72 MMHG | WEIGHT: 91 LBS | HEIGHT: 63 IN

## 2023-06-26 DIAGNOSIS — R80.9 PROTEINURIA, UNSPECIFIED: ICD-10-CM

## 2023-06-26 DIAGNOSIS — B18.1 CHRONIC HEPATITIS B: ICD-10-CM

## 2023-06-26 DIAGNOSIS — N18.9 CHRONIC RENAL INSUFFICIENCY: ICD-10-CM

## 2023-06-26 PROCEDURE — 99213 OFFICE O/P EST LOW 20 MIN: CPT | Performed by: INTERNAL MEDICINE

## 2023-06-26 RX ORDER — TENOFOVIR DISOPROXIL FUMARATE 300 MG/1
TAKE 1 TABLET (300 MG) BY ORAL ROUTE ONCE DAILY WITH A MEAL FOR 90 DAYS TABLET ORAL 1
Qty: 90 | Refills: 3 | Status: ACTIVE | COMMUNITY

## 2023-06-26 RX ORDER — TAMSULOSIN HYDROCHLORIDE 0.4 MG/1
TAKE 1 CAPSULE (0.4 MG) BY ORAL ROUTE ONCE DAILY 1/2 HOUR FOLLOWING THE SAME MEAL EACH DAY CAPSULE ORAL 1
Qty: 0 | Refills: 0 | Status: ACTIVE | COMMUNITY
Start: 1900-01-01

## 2023-06-26 NOTE — HPI-TODAY'S VISIT:
Patient seen in a follow-up visit  Patient accompanied by donna Monteiro    Labs 5/11/2023 ordered by Dr Donald Brandt  KWASI negative Hepatitis B surface antigen reactive Hepatitis C antibody nonreactive Creatinine 1.60 Calcium 8.2 Phosphate 2.0 Uric acid 1.9 Total protein 6.0 Kappa light chains 32.9 and lambda light chains 29.4 on electrophoresis Urine protein to creatinine ratio is 635 with a total protein of random urine at 40  Nephrologist initiated him on Farxiga 10 mg  Lowered his BP meds  Has been encouraged to eat more dairy products  He has not asked patient to d/c tenofivir    Patient is up post CT scan of abdomen done without contrast.  It is performed at [facility] dated 10/31/2022 the liver reveals multiple cysts involving all segments of the liver with numerous hypodensities that are too small to fully characterize like to get more cysts evaluation for solid hepatic masses is considerably limited in the absence of intravenous contrast as well as the background of cystic liver lesions stomach is distended with moderate stool burden in the ascending colon partially imaged no bulky lymph nodes no reported Lab dated 9/23/22 :  AFP < 0.9  Hepatitis DNA not detected   HBeAb  Reactive Hepatitis B e Ag Negative  Hep B s Ag  reactive  Previously he declined with contrast  because of kidney proteinuria   He reports metal in his left shoulder and left hip with pain Denies nausea/vomiting or dysphagia  Denies bleeding  He reports periodic heartburn , food does not  get stuck .Deneis worseing of his symptoms  He takes Famotidine q 2-3 days  Weight loss of few pounds since last visit  He is on Tenofivir  requesting refill and US   MRI  was declined because of reported metal in left shoulder and left  hip  Denies having CT scan done   Back pain s/p injection and needing  heating pads    Colonoscopy : Normal 2022   EGD 2017 : LA grade B esophagitis  No HP   Barium Swallow 2020 by his ENT : Mild esophageal dysmotility  Mild reflux     Labs 1/22 :  Hep B s AB undetectible  Hep B PCR not done   Labs from 5/21  HBV quant negative Hep B s Ag positive   RUQ US Jan 2022: Stable hepatic cyst  No lesions noted    PREVIOUS ENCOUNTER: Labs 5/21  HBV quant is undetectible  AFP 1.4   Previous labs 8/20 Hep B e Ag (-)  Hep B e Ab (+)  Hep B c Ab positive (T)  Hep B c IgM neg  Hep B s Ag positive  VL undetectible   RUQ US 5/21  STable GB polyps and Hepatic cyts   Renal insufficiency under care of Dr Faust/ Dr Pepe    Historical data : Colonoscopy in the chart is from 2012 by Dr. Robbie Mccabe with recommendation to repeat in 10 years EGD with path 3/17 reviewed, takes Omeprazole  Abdominal US reviewed 2/21 :  Benign hepatic cyst, all bladder polyp 3 mm  (unchanged)  Abd US 3/13/19 :  Known Hepatic cysts stable, GB polyp unchanged  Summarizing :Unclear what end point for treatment used. Patient reportedly seroconverted HBe Ag to Ab as per Dr Mccabe notes who was treating previously Given higher risk pt and HBs AG (+) status will continue therapy

## 2023-07-26 ENCOUNTER — LAB OUTSIDE AN ENCOUNTER (OUTPATIENT)
Dept: URBAN - METROPOLITAN AREA CLINIC 78 | Facility: CLINIC | Age: 78
End: 2023-07-26

## 2023-07-26 LAB
CREATININE POC: 1.5
PERFORMING LAB: (no result)

## 2023-07-28 LAB
HBV IU/ML: (no result)
LOG10 HBV IU/ML: (no result)
TEST INFORMATION:: (no result)

## 2023-08-18 ENCOUNTER — OFFICE VISIT (OUTPATIENT)
Dept: URBAN - METROPOLITAN AREA CLINIC 78 | Facility: CLINIC | Age: 78
End: 2023-08-18

## 2023-08-23 ENCOUNTER — OFFICE VISIT (OUTPATIENT)
Dept: URBAN - METROPOLITAN AREA CLINIC 78 | Facility: CLINIC | Age: 78
End: 2023-08-23
Payer: MEDICARE

## 2023-08-23 VITALS
HEART RATE: 72 BPM | HEIGHT: 63 IN | RESPIRATION RATE: 16 BRPM | SYSTOLIC BLOOD PRESSURE: 120 MMHG | BODY MASS INDEX: 15.88 KG/M2 | WEIGHT: 89.6 LBS | TEMPERATURE: 98.1 F | DIASTOLIC BLOOD PRESSURE: 69 MMHG

## 2023-08-23 DIAGNOSIS — R80.9 PROTEINURIA, UNSPECIFIED: ICD-10-CM

## 2023-08-23 DIAGNOSIS — N18.9 CHRONIC RENAL INSUFFICIENCY: ICD-10-CM

## 2023-08-23 DIAGNOSIS — B18.1 CHRONIC HEPATITIS B: ICD-10-CM

## 2023-08-23 DIAGNOSIS — R93.89 ABNORMAL CT SCAN: ICD-10-CM

## 2023-08-23 PROBLEM — 129679001: Status: ACTIVE | Noted: 2023-08-23

## 2023-08-23 PROCEDURE — 99213 OFFICE O/P EST LOW 20 MIN: CPT | Performed by: INTERNAL MEDICINE

## 2023-08-23 RX ORDER — TAMSULOSIN HYDROCHLORIDE 0.4 MG/1
TAKE 1 CAPSULE (0.4 MG) BY ORAL ROUTE ONCE DAILY 1/2 HOUR FOLLOWING THE SAME MEAL EACH DAY CAPSULE ORAL 1
Qty: 0 | Refills: 0 | Status: ACTIVE | COMMUNITY
Start: 1900-01-01

## 2023-08-23 RX ORDER — TENOFOVIR DISOPROXIL FUMARATE 300 MG/1
TAKE 1 TABLET (300 MG) BY ORAL ROUTE ONCE DAILY WITH A MEAL FOR 90 DAYS TABLET ORAL 1
Qty: 90 | Refills: 3 | Status: ACTIVE | COMMUNITY

## 2023-08-23 NOTE — HPI-TODAY'S VISIT:
Patient seen in a follow-up visit  Patient accompanied by son Cayetano   Patient is seen in post CT scan dated 7/26/2023:  1.  Innumerable hepatic cysts noted no liver masses or neoplasm 2.  Large amount of foodstuffs within the distal stomach with question of circumferential thickening of the distal stomach a partial obstruction at the antropyloric junction cannot be excluded correlate with any clinical symptoms of gastric outlet obstruction Patient is accompanied by the son who states that his last meal prior to the CT scan was nearly 18 hours ago.  He is complaining of a lot of heartburn.  Denies nausea vomiting he has some trouble swallowing Weight is stable.   PREVIOUS ENCOUNTER;  Labs 5/11/2023 ordered by Dr Donald Brandt  KWASI negative Hepatitis B surface antigen reactive Hepatitis C antibody nonreactive Creatinine 1.60 Calcium 8.2 Phosphate 2.0 Uric acid 1.9 Total protein 6.0 Kappa light chains 32.9 and lambda light chains 29.4 on electrophoresis Urine protein to creatinine ratio is 635 with a total protein of random urine at 40  Nephrologist initiated him on Farxiga 10 mg  Lowered his BP meds  Has been encouraged to eat more dairy products  He has not asked patient to d/c tenofivir    Patient is up post CT scan of abdomen done without contrast.  It is performed at [facility] dated 10/31/2022 the liver reveals multiple cysts involving all segments of the liver with numerous hypodensities that are too small to fully characterize like to get more cysts evaluation for solid hepatic masses is considerably limited in the absence of intravenous contrast as well as the background of cystic liver lesions stomach is distended with moderate stool burden in the ascending colon partially imaged no bulky lymph nodes no reported Lab dated 9/23/22 :  AFP < 0.9  Hepatitis DNA not detected   HBeAb  Reactive Hepatitis B e Ag Negative  Hep B s Ag  reactive  Previously he declined with contrast  because of kidney proteinuria   He reports metal in his left shoulder and left hip with pain Denies nausea/vomiting or dysphagia  Denies bleeding  He reports periodic heartburn , food does not  get stuck .Deneis worseing of his symptoms  He takes Famotidine q 2-3 days  Weight loss of few pounds since last visit  He is on Tenofivir  requesting refill and US   MRI  was declined because of reported metal in left shoulder and left  hip  Denies having CT scan done   Back pain s/p injection and needing  heating pads    Colonoscopy : Normal 2022   EGD 2017 : LA grade B esophagitis  No HP   Barium Swallow 2020 by his ENT : Mild esophageal dysmotility  Mild reflux     Labs 1/22 :  Hep B s AB undetectible  Hep B PCR not done   Labs from 5/21  HBV quant negative Hep B s Ag positive   RUQ US Jan 2022: Stable hepatic cyst  No lesions noted    PREVIOUS ENCOUNTER: Labs 5/21  HBV quant is undetectible  AFP 1.4   Previous labs 8/20 Hep B e Ag (-)  Hep B e Ab (+)  Hep B c Ab positive (T)  Hep B c IgM neg  Hep B s Ag positive  VL undetectible   RUQ US 5/21  STable GB polyps and Hepatic cyts   Renal insufficiency under care of Dr Faust/ Dr Pepe    Historical data : Colonoscopy in the chart is from 2012 by Dr. Robbie Mccabe with recommendation to repeat in 10 years EGD with path 3/17 reviewed, takes Omeprazole  Abdominal US reviewed 2/21 :  Benign hepatic cyst, all bladder polyp 3 mm  (unchanged)  Abd US 3/13/19 :  Known Hepatic cysts stable, GB polyp unchanged  Summarizing :Unclear what end point for treatment used. Patient reportedly seroconverted HBe Ag to Ab as per Dr Mccabe notes who was treating previously Given higher risk pt and HBs AG (+) status will continue therapy

## 2023-08-30 ENCOUNTER — P2P PATIENT RECORD (OUTPATIENT)
Age: 78
End: 2023-08-30

## 2023-09-06 ENCOUNTER — OFFICE VISIT (OUTPATIENT)
Dept: URBAN - METROPOLITAN AREA MEDICAL CENTER 10 | Facility: MEDICAL CENTER | Age: 78
End: 2023-09-06
Payer: MEDICARE

## 2023-09-06 DIAGNOSIS — K22.4 CORKSCREW ESOPHAGUS: ICD-10-CM

## 2023-09-06 DIAGNOSIS — R93.3 ABN FINDINGS-GI TRACT: ICD-10-CM

## 2023-09-06 DIAGNOSIS — K29.60 ADENOPAPILLOMATOSIS GASTRICA: ICD-10-CM

## 2023-09-06 PROCEDURE — 43239 EGD BIOPSY SINGLE/MULTIPLE: CPT | Performed by: INTERNAL MEDICINE

## 2023-09-11 ENCOUNTER — OFFICE VISIT (OUTPATIENT)
Dept: URBAN - METROPOLITAN AREA CLINIC 78 | Facility: CLINIC | Age: 78
End: 2023-09-11

## 2024-01-22 ENCOUNTER — DASHBOARD ENCOUNTERS (OUTPATIENT)
Age: 79
End: 2024-01-22

## 2024-01-22 ENCOUNTER — OFFICE VISIT (OUTPATIENT)
Dept: URBAN - METROPOLITAN AREA CLINIC 78 | Facility: CLINIC | Age: 79
End: 2024-01-22
Payer: MEDICARE

## 2024-01-22 VITALS
RESPIRATION RATE: 14 BRPM | SYSTOLIC BLOOD PRESSURE: 128 MMHG | HEART RATE: 81 BPM | BODY MASS INDEX: 17.08 KG/M2 | HEIGHT: 63 IN | TEMPERATURE: 98.1 F | WEIGHT: 96.4 LBS | DIASTOLIC BLOOD PRESSURE: 80 MMHG

## 2024-01-22 DIAGNOSIS — B18.1 CHRONIC HEPATITIS B: ICD-10-CM

## 2024-01-22 DIAGNOSIS — R80.9 PROTEINURIA, UNSPECIFIED: ICD-10-CM

## 2024-01-22 DIAGNOSIS — R12 HEARTBURN SYMPTOM: ICD-10-CM

## 2024-01-22 DIAGNOSIS — N18.9 CHRONIC RENAL INSUFFICIENCY: ICD-10-CM

## 2024-01-22 DIAGNOSIS — K22.4 SPASM OF ESOPHAGUS: ICD-10-CM

## 2024-01-22 PROBLEM — 266434009: Status: ACTIVE | Noted: 2024-01-22

## 2024-01-22 PROCEDURE — 99213 OFFICE O/P EST LOW 20 MIN: CPT | Performed by: INTERNAL MEDICINE

## 2024-01-22 RX ORDER — TENOFOVIR DISOPROXIL FUMARATE 300 MG/1
TAKE 1 TABLET (300 MG) BY ORAL ROUTE ONCE DAILY WITH A MEAL FOR 90 DAYS TABLET ORAL 1
Qty: 90 | Refills: 3 | Status: ACTIVE | COMMUNITY

## 2024-01-22 RX ORDER — TAMSULOSIN HYDROCHLORIDE 0.4 MG/1
TAKE 1 CAPSULE (0.4 MG) BY ORAL ROUTE ONCE DAILY 1/2 HOUR FOLLOWING THE SAME MEAL EACH DAY CAPSULE ORAL 1
Qty: 0 | Refills: 0 | Status: ACTIVE | COMMUNITY
Start: 1900-01-01

## 2024-01-22 NOTE — HPI-TODAY'S VISIT:
Patient seen in a follow-up visit  Patient accompanied by son Cayetano    Patient reports heartburn and dysphagia post prandial   s/p EGD with path 9/6/23  takes Omeprazole after meals in am and Famotidine in evening   Weight is stable  Compliant with Tenofivir   He is on Losartan , started for his kidneys    PREVIOUS ENCOUNTER:  Patient is seen in post CT scan dated 7/26/2023:  1.  Innumerable hepatic cysts noted no liver masses or neoplasm 2.  Large amount of foodstuffs within the distal stomach with question of circumferential thickening of the distal stomach a partial obstruction at the antropyloric junction cannot be excluded correlate with any clinical symptoms of gastric outlet obstruction Patient is accompanied by the son who states that his last meal prior to the CT scan was nearly 18 hours ago.  He is complaining of a lot of heartburn.  Denies nausea vomiting he has some trouble swallowing Weight is stable.   PREVIOUS ENCOUNTER;  Labs 5/11/2023 ordered by Dr Donald Brandt  KWASI negative Hepatitis B surface antigen reactive Hepatitis C antibody nonreactive Creatinine 1.60 Calcium 8.2 Phosphate 2.0 Uric acid 1.9 Total protein 6.0 Kappa light chains 32.9 and lambda light chains 29.4 on electrophoresis Urine protein to creatinine ratio is 635 with a total protein of random urine at 40  Nephrologist initiated him on Farxiga 10 mg  Lowered his BP meds  Has been encouraged to eat more dairy products  He has not asked patient to d/c tenofivir    Patient is up post CT scan of abdomen done without contrast.  It is performed at [facility] dated 10/31/2022 the liver reveals multiple cysts involving all segments of the liver with numerous hypodensities that are too small to fully characterize like to get more cysts evaluation for solid hepatic masses is considerably limited in the absence of intravenous contrast as well as the background of cystic liver lesions stomach is distended with moderate stool burden in the ascending colon partially imaged no bulky lymph nodes no reported Lab dated 9/23/22 :  AFP < 0.9  Hepatitis DNA not detected   HBeAb  Reactive Hepatitis B e Ag Negative  Hep B s Ag  reactive  Previously he declined with contrast  because of kidney proteinuria   He reports metal in his left shoulder and left hip with pain Denies nausea/vomiting or dysphagia  Denies bleeding  He reports periodic heartburn , food does not  get stuck .Deneis worseing of his symptoms  He takes Famotidine q 2-3 days  Weight loss of few pounds since last visit  He is on Tenofivir  requesting refill and US   MRI  was declined because of reported metal in left shoulder and left  hip  Denies having CT scan done   Back pain s/p injection and needing  heating pads    Colonoscopy : Normal 2022   EGD 2017 : LA grade B esophagitis  No HP   Barium Swallow 2020 by his ENT : Mild esophageal dysmotility  Mild reflux     Labs 1/22 :  Hep B s AB undetectible  Hep B PCR not done   Labs from 5/21  HBV quant negative Hep B s Ag positive   RUQ US Jan 2022: Stable hepatic cyst  No lesions noted    PREVIOUS ENCOUNTER: Labs 5/21  HBV quant is undetectible  AFP 1.4   Previous labs 8/20 Hep B e Ag (-)  Hep B e Ab (+)  Hep B c Ab positive (T)  Hep B c IgM neg  Hep B s Ag positive  VL undetectible   RUQ US 5/21  STable GB polyps and Hepatic cyts   Renal insufficiency under care of Dr Faust/ Dr Pepe    Historical data : Colonoscopy in the chart is from 2012 by Dr. Robbie Mccabe with recommendation to repeat in 10 years EGD with path 3/17 reviewed, takes Omeprazole  Abdominal US reviewed 2/21 :  Benign hepatic cyst, all bladder polyp 3 mm  (unchanged)  Abd US 3/13/19 :  Known Hepatic cysts stable, GB polyp unchanged  Summarizing :Unclear what end point for treatment used. Patient reportedly seroconverted HBe Ag to Ab as per Dr Mccabe notes who was treating previously Given higher risk pt and HBs AG (+) status will continue therapy

## 2024-01-25 ENCOUNTER — P2P PATIENT RECORD (OUTPATIENT)
Age: 79
End: 2024-01-25

## 2024-01-26 LAB
HBV LOG10: NOT DETECTED
HEPATITIS B SURFACE ANTIGEN: REACTIVE
HEPATITIS B VIRUS DNA: NOT DETECTED

## 2024-01-30 ENCOUNTER — P2P PATIENT RECORD (OUTPATIENT)
Age: 79
End: 2024-01-30

## 2024-05-20 ENCOUNTER — P2P PATIENT RECORD (OUTPATIENT)
Age: 79
End: 2024-05-20

## 2024-07-22 ENCOUNTER — OFFICE VISIT (OUTPATIENT)
Dept: URBAN - METROPOLITAN AREA CLINIC 78 | Facility: CLINIC | Age: 79
End: 2024-07-22
Payer: MEDICARE

## 2024-07-22 VITALS
TEMPERATURE: 98.1 F | WEIGHT: 90 LBS | HEIGHT: 63 IN | HEART RATE: 60 BPM | RESPIRATION RATE: 16 BRPM | DIASTOLIC BLOOD PRESSURE: 79 MMHG | BODY MASS INDEX: 15.95 KG/M2 | SYSTOLIC BLOOD PRESSURE: 116 MMHG

## 2024-07-22 DIAGNOSIS — N18.9 CHRONIC RENAL INSUFFICIENCY: ICD-10-CM

## 2024-07-22 DIAGNOSIS — R80.9 PROTEINURIA, UNSPECIFIED: ICD-10-CM

## 2024-07-22 DIAGNOSIS — R12 HEARTBURN SYMPTOM: ICD-10-CM

## 2024-07-22 DIAGNOSIS — B18.1 CHRONIC HEPATITIS B: ICD-10-CM

## 2024-07-22 PROCEDURE — 99213 OFFICE O/P EST LOW 20 MIN: CPT | Performed by: INTERNAL MEDICINE

## 2024-07-22 RX ORDER — TENOFOVIR DISOPROXIL FUMARATE 300 MG/1
TAKE 1 TABLET (300 MG) BY ORAL ROUTE ONCE DAILY WITH A MEAL FOR 90 DAYS TABLET ORAL 1
Qty: 90 | Refills: 3

## 2024-07-22 RX ORDER — TENOFOVIR DISOPROXIL FUMARATE 300 MG/1
TAKE 1 TABLET (300 MG) BY ORAL ROUTE ONCE DAILY WITH A MEAL FOR 90 DAYS TABLET ORAL 1
Qty: 90 | Refills: 3 | Status: ACTIVE | COMMUNITY

## 2024-07-22 RX ORDER — DAPAGLIFLOZIN 10 MG/1
1 TABLET TABLET, FILM COATED ORAL ONCE A DAY
Status: ACTIVE | COMMUNITY

## 2024-07-22 RX ORDER — TAMSULOSIN HYDROCHLORIDE 0.4 MG/1
TAKE 1 CAPSULE (0.4 MG) BY ORAL ROUTE ONCE DAILY 1/2 HOUR FOLLOWING THE SAME MEAL EACH DAY CAPSULE ORAL 1
Qty: 0 | Refills: 0 | Status: ACTIVE | COMMUNITY
Start: 1900-01-01

## 2024-07-22 NOTE — HPI-TODAY'S VISIT:
Patient seen in a follow-up visit  Patient accompanied by son Cayetano    Patient is doing better occassional heartburn otherwise eating better  BM are okay  Weight loss has stabilized  Compliant with Tenofivir  VL is undetectible  Last time he saw his Nephrologist was 2 months ago     s/p EGD with path 9/6/23  takes Omeprazole after meals in am and Famotidine in evening     He is on Losartan , started for his kidneys    PREVIOUS ENCOUNTER:  Patient is seen in post CT scan dated 7/26/2023:  1.  Innumerable hepatic cysts noted no liver masses or neoplasm 2.  Large amount of foodstuffs within the distal stomach with question of circumferential thickening of the distal stomach a partial obstruction at the antropyloric junction cannot be excluded correlate with any clinical symptoms of gastric outlet obstruction Patient is accompanied by the son who states that his last meal prior to the CT scan was nearly 18 hours ago.  He is complaining of a lot of heartburn.  Denies nausea vomiting he has some trouble swallowing Weight is stable.   PREVIOUS ENCOUNTER;  Labs 5/11/2023 ordered by Dr Donald Brandt  KWASI negative Hepatitis B surface antigen reactive Hepatitis C antibody nonreactive Creatinine 1.60 Calcium 8.2 Phosphate 2.0 Uric acid 1.9 Total protein 6.0 Kappa light chains 32.9 and lambda light chains 29.4 on electrophoresis Urine protein to creatinine ratio is 635 with a total protein of random urine at 40  Nephrologist initiated him on Farxiga 10 mg  Lowered his BP meds  Has been encouraged to eat more dairy products  He has not asked patient to d/c tenofivir    Patient is up post CT scan of abdomen done without contrast.  It is performed at [facility] dated 10/31/2022 the liver reveals multiple cysts involving all segments of the liver with numerous hypodensities that are too small to fully characterize like to get more cysts evaluation for solid hepatic masses is considerably limited in the absence of intravenous contrast as well as the background of cystic liver lesions stomach is distended with moderate stool burden in the ascending colon partially imaged no bulky lymph nodes no reported Lab dated 9/23/22 :  AFP < 0.9  Hepatitis DNA not detected   HBeAb  Reactive Hepatitis B e Ag Negative  Hep B s Ag  reactive  Previously he declined with contrast  because of kidney proteinuria   He reports metal in his left shoulder and left hip with pain Denies nausea/vomiting or dysphagia  Denies bleeding  He reports periodic heartburn , food does not  get stuck .Deneis worseing of his symptoms  He takes Famotidine q 2-3 days  Weight loss of few pounds since last visit  He is on Tenofivir  requesting refill and US   MRI  was declined because of reported metal in left shoulder and left  hip  Denies having CT scan done   Back pain s/p injection and needing  heating pads    Colonoscopy : Normal 2022   EGD 2017 : LA grade B esophagitis  No HP   Barium Swallow 2020 by his ENT : Mild esophageal dysmotility  Mild reflux     Labs 1/22 :  Hep B s AB undetectible  Hep B PCR not done   Labs from 5/21  HBV quant negative Hep B s Ag positive   RUQ US Jan 2022: Stable hepatic cyst  No lesions noted    PREVIOUS ENCOUNTER: Labs 5/21  HBV quant is undetectible  AFP 1.4   Previous labs 8/20 Hep B e Ag (-)  Hep B e Ab (+)  Hep B c Ab positive (T)  Hep B c IgM neg  Hep B s Ag positive  VL undetectible   RUQ US 5/21  STable GB polyps and Hepatic cyts   Renal insufficiency under care of Dr Faust/ Dr Pepe    Historical data : Colonoscopy in the chart is from 2012 by Dr. Robbie Mccabe with recommendation to repeat in 10 years EGD with path 3/17 reviewed, takes Omeprazole  Abdominal US reviewed 2/21 :  Benign hepatic cyst, all bladder polyp 3 mm  (unchanged)  Abd US 3/13/19 :  Known Hepatic cysts stable, GB polyp unchanged  Summarizing :Unclear what end point for treatment used. Patient reportedly seroconverted HBe Ag to Ab as per Dr Mccabe notes who was treating previously Given higher risk pt and HBs AG (+) status will continue therapy

## 2024-08-28 ENCOUNTER — OFFICE VISIT (OUTPATIENT)
Dept: URBAN - METROPOLITAN AREA CLINIC 77 | Facility: CLINIC | Age: 79
End: 2024-08-28
Payer: COMMERCIAL

## 2024-08-28 DIAGNOSIS — K76.89 LIVER CYST: ICD-10-CM

## 2024-08-28 DIAGNOSIS — K82.4 GALLBLADDER POLYP: ICD-10-CM

## 2024-08-28 PROCEDURE — 76705 ECHO EXAM OF ABDOMEN: CPT | Performed by: INTERNAL MEDICINE

## 2024-09-17 ENCOUNTER — OFFICE VISIT (OUTPATIENT)
Dept: URBAN - METROPOLITAN AREA SURGERY CENTER 15 | Facility: SURGERY CENTER | Age: 79
End: 2024-09-17

## 2024-09-19 ENCOUNTER — P2P PATIENT RECORD (OUTPATIENT)
Age: 79
End: 2024-09-19

## 2024-10-09 ENCOUNTER — OFFICE VISIT (OUTPATIENT)
Dept: URBAN - METROPOLITAN AREA CLINIC 78 | Facility: CLINIC | Age: 79
End: 2024-10-09
Payer: COMMERCIAL

## 2024-10-09 VITALS
HEIGHT: 63 IN | RESPIRATION RATE: 14 BRPM | TEMPERATURE: 98.1 F | SYSTOLIC BLOOD PRESSURE: 121 MMHG | BODY MASS INDEX: 15.66 KG/M2 | WEIGHT: 88.4 LBS | DIASTOLIC BLOOD PRESSURE: 78 MMHG | HEART RATE: 68 BPM

## 2024-10-09 DIAGNOSIS — R10.13 DYSPEPSIA: ICD-10-CM

## 2024-10-09 DIAGNOSIS — R13.19 ESOPHAGEAL DYSPHAGIA: ICD-10-CM

## 2024-10-09 DIAGNOSIS — K82.4 GALLBLADDER POLYP: ICD-10-CM

## 2024-10-09 DIAGNOSIS — B18.1 CHRONIC HEPATITIS B: ICD-10-CM

## 2024-10-09 DIAGNOSIS — N18.9 CHRONIC RENAL DISEASE: ICD-10-CM

## 2024-10-09 PROCEDURE — 99214 OFFICE O/P EST MOD 30 MIN: CPT | Performed by: INTERNAL MEDICINE

## 2024-10-09 RX ORDER — TENOFOVIR DISOPROXIL FUMARATE 300 MG/1
TAKE 1 TABLET (300 MG) BY ORAL ROUTE ONCE DAILY WITH A MEAL FOR 90 DAYS TABLET ORAL 1
Qty: 90 | Refills: 3 | Status: ACTIVE | COMMUNITY

## 2024-10-09 RX ORDER — DAPAGLIFLOZIN 10 MG/1
1 TABLET TABLET, FILM COATED ORAL ONCE A DAY
Status: ACTIVE | COMMUNITY

## 2024-10-09 RX ORDER — TAMSULOSIN HYDROCHLORIDE 0.4 MG/1
TAKE 1 CAPSULE (0.4 MG) BY ORAL ROUTE ONCE DAILY 1/2 HOUR FOLLOWING THE SAME MEAL EACH DAY CAPSULE ORAL 1
Qty: 0 | Refills: 0 | Status: ACTIVE | COMMUNITY
Start: 1900-01-01

## 2024-10-09 NOTE — HPI-TODAY'S VISIT:
Patient seen in a follow-up visit  Patient accompanied by son Cayetano    Presenting complaint: Heartburn and difficulty swallowing - History of present illness: - Experiencing frequent heartburn, especially after eating - Difficulty swallowing both solids and liquids, worsening over time - Decreased appetite - No change in diet - Not consuming spicy or rich foods - Currently taking pantoprazole once daily at night, not providing relief -   Past medical history: - Hepatitis B, on tenofovir treatment   Reflux-related inflammation found on endoscopy on 09/06/2023 - Barium swallow study in 2020 showed: - Bone degenerative changes in C5 and C7 - Decreased esophageal peristalsis with extra contractions - No hiatal hernia - Barium tablet passed successfully   - Investigations: - Hepatitis B viral load: Results pending (specimen issue, test not performed) - Ultrasound (August): - No gallstones - 3 mm gallbladder polyp - Liver within normal limits - Largest cyst measuring 6.3 mm - No solid liver lesions - Patent vessels - CT scan (last year): - Confirmed liver cyst - Showed food retention in stomach, suggesting delayed gastric emptying   PREVIOUS ENCOUNTER:  Patient is doing better occassional heartburn otherwise eating better  BM are okay  Weight loss has stabilized  Compliant with Tenofivir  VL is undetectible  Last time he saw his Nephrologist was 2 months ago     s/p EGD with path 9/6/23  takes Omeprazole after meals in am and Famotidine in evening     He is on Losartan , started for his kidneys    PREVIOUS ENCOUNTER:  Patient is seen in post CT scan dated 7/26/2023:  1.  Innumerable hepatic cysts noted no liver masses or neoplasm 2.  Large amount of foodstuffs within the distal stomach with question of circumferential thickening of the distal stomach a partial obstruction at the antropyloric junction cannot be excluded correlate with any clinical symptoms of gastric outlet obstruction Patient is accompanied by the son who states that his last meal prior to the CT scan was nearly 18 hours ago.  He is complaining of a lot of heartburn.  Denies nausea vomiting he has some trouble swallowing Weight is stable.   PREVIOUS ENCOUNTER;  Labs 5/11/2023 ordered by Dr Donald Brandt  KWASI negative Hepatitis B surface antigen reactive Hepatitis C antibody nonreactive Creatinine 1.60 Calcium 8.2 Phosphate 2.0 Uric acid 1.9 Total protein 6.0 Kappa light chains 32.9 and lambda light chains 29.4 on electrophoresis Urine protein to creatinine ratio is 635 with a total protein of random urine at 40  Nephrologist initiated him on Farxiga 10 mg  Lowered his BP meds  Has been encouraged to eat more dairy products  He has not asked patient to d/c tenofivir    Patient is up post CT scan of abdomen done without contrast.  It is performed at [facility] dated 10/31/2022 the liver reveals multiple cysts involving all segments of the liver with numerous hypodensities that are too small to fully characterize like to get more cysts evaluation for solid hepatic masses is considerably limited in the absence of intravenous contrast as well as the background of cystic liver lesions stomach is distended with moderate stool burden in the ascending colon partially imaged no bulky lymph nodes no reported Lab dated 9/23/22 :  AFP < 0.9  Hepatitis DNA not detected   HBeAb  Reactive Hepatitis B e Ag Negative  Hep B s Ag  reactive  Previously he declined with contrast  because of kidney proteinuria   He reports metal in his left shoulder and left hip with pain Denies nausea/vomiting or dysphagia  Denies bleeding  He reports periodic heartburn , food does not  get stuck .Deneis worseing of his symptoms  He takes Famotidine q 2-3 days  Weight loss of few pounds since last visit  He is on Tenofivir  requesting refill and US   MRI  was declined because of reported metal in left shoulder and left  hip  Denies having CT scan done   Back pain s/p injection and needing  heating pads    Colonoscopy : Normal 2022   EGD 2017 : LA grade B esophagitis  No HP   Barium Swallow 2020 by his ENT : Mild esophageal dysmotility  Mild reflux     Labs 1/22 :  Hep B s AB undetectible  Hep B PCR not done   Labs from 5/21  HBV quant negative Hep B s Ag positive   RUQ US Jan 2022: Stable hepatic cyst  No lesions noted    PREVIOUS ENCOUNTER: Labs 5/21  HBV quant is undetectible  AFP 1.4   Previous labs 8/20 Hep B e Ag (-)  Hep B e Ab (+)  Hep B c Ab positive (T)  Hep B c IgM neg  Hep B s Ag positive  VL undetectible   RUQ US 5/21  STable GB polyps and Hepatic cyts   Renal insufficiency under care of Dr Faust/ Dr Pepe    Historical data : Colonoscopy in the chart is from 2012 by Dr. Robbie Mccabe with recommendation to repeat in 10 years EGD with path 3/17 reviewed, takes Omeprazole  Abdominal US reviewed 2/21 :  Benign hepatic cyst, all bladder polyp 3 mm  (unchanged)  Abd US 3/13/19 :  Known Hepatic cysts stable, GB polyp unchanged  Summarizing :Unclear what end point for treatment used. Patient reportedly seroconverted HBe Ag to Ab as per Dr Mccabe notes who was treating previously Given higher risk pt and HBs AG (+) status will continue therapy

## 2024-10-15 ENCOUNTER — OFFICE VISIT (OUTPATIENT)
Dept: URBAN - METROPOLITAN AREA SURGERY CENTER 15 | Facility: SURGERY CENTER | Age: 79
End: 2024-10-15
Payer: COMMERCIAL

## 2024-10-15 ENCOUNTER — CLAIMS CREATED FROM THE CLAIM WINDOW (OUTPATIENT)
Dept: URBAN - METROPOLITAN AREA CLINIC 4 | Facility: CLINIC | Age: 79
End: 2024-10-15
Payer: COMMERCIAL

## 2024-10-15 DIAGNOSIS — K22.4 ESOPHAGEAL DYSMOTILITY: ICD-10-CM

## 2024-10-15 DIAGNOSIS — K29.70 GASTRITIS, UNSPECIFIED, WITHOUT BLEEDING: ICD-10-CM

## 2024-10-15 DIAGNOSIS — K31.89 OTHER DISEASES OF STOMACH AND DUODENUM: ICD-10-CM

## 2024-10-15 DIAGNOSIS — K21.9 ACID REFLUX: ICD-10-CM

## 2024-10-15 DIAGNOSIS — K21.9 GASTRO-ESOPHAGEAL REFLUX DISEASE WITHOUT ESOPHAGITIS: ICD-10-CM

## 2024-10-15 DIAGNOSIS — K29.50 ANTRAL GASTRITIS: ICD-10-CM

## 2024-10-15 DIAGNOSIS — R13.19 CERVICAL DYSPHAGIA: ICD-10-CM

## 2024-10-15 PROCEDURE — 88312 SPECIAL STAINS GROUP 1: CPT | Performed by: PATHOLOGY

## 2024-10-15 PROCEDURE — 43249 ESOPH EGD DILATION <30 MM: CPT | Performed by: INTERNAL MEDICINE

## 2024-10-15 PROCEDURE — 00731 ANES UPR GI NDSC PX NOS: CPT | Performed by: NURSE ANESTHETIST, CERTIFIED REGISTERED

## 2024-10-15 PROCEDURE — 43239 EGD BIOPSY SINGLE/MULTIPLE: CPT | Performed by: INTERNAL MEDICINE

## 2024-10-15 PROCEDURE — 88305 TISSUE EXAM BY PATHOLOGIST: CPT | Performed by: PATHOLOGY

## 2025-01-23 ENCOUNTER — P2P PATIENT RECORD (OUTPATIENT)
Age: 80
End: 2025-01-23

## 2025-02-17 ENCOUNTER — OFFICE VISIT (OUTPATIENT)
Dept: URBAN - METROPOLITAN AREA CLINIC 78 | Facility: CLINIC | Age: 80
End: 2025-02-17
Payer: COMMERCIAL

## 2025-02-17 VITALS
HEART RATE: 90 BPM | TEMPERATURE: 98.1 F | BODY MASS INDEX: 16.8 KG/M2 | WEIGHT: 94.8 LBS | SYSTOLIC BLOOD PRESSURE: 89 MMHG | HEIGHT: 63 IN | DIASTOLIC BLOOD PRESSURE: 55 MMHG

## 2025-02-17 DIAGNOSIS — N18.9 CHRONIC RENAL DISEASE: ICD-10-CM

## 2025-02-17 DIAGNOSIS — K82.4 GALLBLADDER POLYP: ICD-10-CM

## 2025-02-17 DIAGNOSIS — B18.1 CHRONIC HEPATITIS B: ICD-10-CM

## 2025-02-17 DIAGNOSIS — D64.9 MILD ANEMIA: ICD-10-CM

## 2025-02-17 DIAGNOSIS — D75.89 MACROCYTOSIS: ICD-10-CM

## 2025-02-17 PROBLEM — 397073000: Status: ACTIVE | Noted: 2025-02-17

## 2025-02-17 PROBLEM — 271737000: Status: ACTIVE | Noted: 2025-02-17

## 2025-02-17 PROCEDURE — 99213 OFFICE O/P EST LOW 20 MIN: CPT | Performed by: INTERNAL MEDICINE

## 2025-02-17 RX ORDER — DAPAGLIFLOZIN 10 MG/1
1 TABLET TABLET, FILM COATED ORAL ONCE A DAY
Status: ACTIVE | COMMUNITY

## 2025-02-17 RX ORDER — TAMSULOSIN HYDROCHLORIDE 0.4 MG/1
TAKE 1 CAPSULE (0.4 MG) BY ORAL ROUTE ONCE DAILY 1/2 HOUR FOLLOWING THE SAME MEAL EACH DAY CAPSULE ORAL 1
Qty: 0 | Refills: 0 | Status: ACTIVE | COMMUNITY
Start: 1900-01-01

## 2025-02-17 RX ORDER — TENOFOVIR DISOPROXIL FUMARATE 300 MG/1
TAKE 1 TABLET (300 MG) BY ORAL ROUTE ONCE DAILY WITH A MEAL FOR 90 DAYS TABLET ORAL 1
Qty: 90 | Refills: 3 | Status: ACTIVE | COMMUNITY

## 2025-02-17 NOTE — HPI-TODAY'S VISIT:
Patient was referred by Valdemar Alvarez A copy of this document will be sent to the physician.     - Presents for review of blood test results - History of chronic hepatitis B, on telomere therapy as approved by nephrology - Unable to undergo MRI due to metal in shoulder Objective: - Weight: Notable recent weight loss observed - Physical examination: Performed respiratory examination Labs (12/2024): Creatinine: 1.53  Glucose: 67 (fasting) - low Hemoglobin: 12.0 - low  Hematocrit: 38.2 MCV: 102  Liver function tests: Within normal limits Imaging: - Ultrasound (08/28/2024): Shows benign cyst and stable 3mm polyp

## 2025-02-24 ENCOUNTER — P2P PATIENT RECORD (OUTPATIENT)
Age: 80
End: 2025-02-24

## 2025-03-07 ENCOUNTER — TELEPHONE ENCOUNTER (OUTPATIENT)
Dept: URBAN - METROPOLITAN AREA CLINIC 78 | Facility: CLINIC | Age: 80
End: 2025-03-07

## 2025-03-08 LAB
AFP, SERUM, TUMOR MARKER: 1.8
ALBUMIN: 4.2
ALKALINE PHOSPHATASE: 75
ALT (SGPT): 16
AST (SGOT): 17
BASO (ABSOLUTE): 0
BASOS: 0
BILIRUBIN, TOTAL: 0.5
BUN/CREATININE RATIO: 19
BUN: 30
CALCIUM: 8.8
CARBON DIOXIDE, TOTAL: 19
CHLORIDE: 105
CREATININE: 1.58
EGFR: 45
EOS (ABSOLUTE): 0
EOS: 0
FERRITIN, SERUM: 341
GLOBULIN, TOTAL: 2.4
GLUCOSE: 80
HEMATOCRIT: 41.1
HEMOGLOBIN: 12.9
HEPATITIS B SURF AB QUANT: <3.5
IMMATURE GRANS (ABS): 0
IMMATURE GRANULOCYTES: 0
LYMPHS (ABSOLUTE): 1.3
LYMPHS: 21
MCH: 32.1
MCHC: 31.4
MCV: 102
MONOCYTES(ABSOLUTE): 0.5
MONOCYTES: 9
NEUTROPHILS (ABSOLUTE): 4.3
NEUTROPHILS: 70
PLATELETS: 196
POTASSIUM: 3.6
PROTEIN, TOTAL: 6.6
RBC: 4.02
RDW: 12.5
SODIUM: 140
WBC: 6.1

## 2025-03-25 ENCOUNTER — OFFICE VISIT (OUTPATIENT)
Dept: URBAN - METROPOLITAN AREA CLINIC 77 | Facility: CLINIC | Age: 80
End: 2025-03-25
Payer: COMMERCIAL

## 2025-03-25 DIAGNOSIS — K82.4 GALLBLADDER POLYP: ICD-10-CM

## 2025-03-25 DIAGNOSIS — K76.9: ICD-10-CM

## 2025-03-25 DIAGNOSIS — B18.1 CHRONIC HEPATITIS B: ICD-10-CM

## 2025-03-25 PROCEDURE — 76705 ECHO EXAM OF ABDOMEN: CPT | Performed by: INTERNAL MEDICINE

## 2025-04-02 ENCOUNTER — OFFICE VISIT (OUTPATIENT)
Dept: URBAN - METROPOLITAN AREA CLINIC 78 | Facility: CLINIC | Age: 80
End: 2025-04-02
Payer: COMMERCIAL

## 2025-04-02 DIAGNOSIS — D75.89 MACROCYTOSIS: ICD-10-CM

## 2025-04-02 DIAGNOSIS — M79.672 LEFT FOOT PAIN: ICD-10-CM

## 2025-04-02 DIAGNOSIS — D64.9 MILD ANEMIA: ICD-10-CM

## 2025-04-02 DIAGNOSIS — B18.1 CHRONIC HEPATITIS B: ICD-10-CM

## 2025-04-02 DIAGNOSIS — N18.9 CHRONIC RENAL DISEASE: ICD-10-CM

## 2025-04-02 DIAGNOSIS — K82.4 GALLBLADDER POLYP: ICD-10-CM

## 2025-04-02 PROCEDURE — 99213 OFFICE O/P EST LOW 20 MIN: CPT | Performed by: INTERNAL MEDICINE

## 2025-04-02 RX ORDER — TAMSULOSIN HYDROCHLORIDE 0.4 MG/1
TAKE 1 CAPSULE (0.4 MG) BY ORAL ROUTE ONCE DAILY 1/2 HOUR FOLLOWING THE SAME MEAL EACH DAY CAPSULE ORAL 1
Qty: 0 | Refills: 0 | Status: ACTIVE | COMMUNITY
Start: 1900-01-01

## 2025-04-02 RX ORDER — TENOFOVIR DISOPROXIL FUMARATE 300 MG/1
TAKE 1 TABLET (300 MG) BY ORAL ROUTE ONCE DAILY WITH A MEAL FOR 90 DAYS TABLET ORAL 1
Qty: 90 | Refills: 3 | Status: ACTIVE | COMMUNITY

## 2025-04-02 RX ORDER — DAPAGLIFLOZIN 10 MG/1
1 TABLET TABLET, FILM COATED ORAL ONCE A DAY
Status: ACTIVE | COMMUNITY

## 2025-04-02 NOTE — HPI-TODAY'S VISIT:
Patient was referred by Dr. Mccloud  A copy of this document will be sent to the physician.   Patient has seen Dr Salgado for left ankle pain and swelling . Reportedly he underwent Doppler US as per son and has also seen Ortho who gave him a shot and diagnosed him with Vit D and calcium deficiency .      - Presents for review of blood test results - History of chronic hepatitis B, Tenofivir  therapy as approved by nephrology - Unable to undergo MRI due to metal in shoulder Objective: - Weight: Notable recent weight loss observed - Physical examination: Performed respiratory examination Labs (12/2024): Creatinine: 1.53  Glucose: 67 (fasting) - low Hemoglobin: 12.0 - low  Hematocrit: 38.2 MCV: 102  Liver function tests: Within normal limits Imaging: - Ultrasound (03/30/25 ): Shows benign cyst and stable 3mm polyp

## 2025-05-06 ENCOUNTER — TELEPHONE ENCOUNTER (OUTPATIENT)
Dept: URBAN - METROPOLITAN AREA CLINIC 78 | Facility: CLINIC | Age: 80
End: 2025-05-06

## 2025-05-06 RX ORDER — TENOFOVIR DISOPROXIL FUMARATE 300 MG/1
TAKE 1 TABLET (300 MG) BY ORAL ROUTE ONCE DAILY WITH A MEAL FOR 90 DAYS TABLET ORAL 1
Qty: 90 | Refills: 3

## 2025-05-08 ENCOUNTER — OFFICE VISIT (OUTPATIENT)
Dept: URBAN - METROPOLITAN AREA CLINIC 78 | Facility: CLINIC | Age: 80
End: 2025-05-08

## 2025-05-12 ENCOUNTER — TELEPHONE ENCOUNTER (OUTPATIENT)
Dept: URBAN - METROPOLITAN AREA CLINIC 78 | Facility: CLINIC | Age: 80
End: 2025-05-12

## 2025-05-23 ENCOUNTER — TELEPHONE ENCOUNTER (OUTPATIENT)
Dept: URBAN - METROPOLITAN AREA CLINIC 5 | Facility: CLINIC | Age: 80
End: 2025-05-23

## 2025-06-09 ENCOUNTER — OFFICE VISIT (OUTPATIENT)
Dept: URBAN - METROPOLITAN AREA CLINIC 78 | Facility: CLINIC | Age: 80
End: 2025-06-09

## 2025-06-16 ENCOUNTER — OFFICE VISIT (OUTPATIENT)
Dept: URBAN - METROPOLITAN AREA CLINIC 78 | Facility: CLINIC | Age: 80
End: 2025-06-16
Payer: COMMERCIAL

## 2025-06-16 DIAGNOSIS — N18.9 CHRONIC RENAL DISEASE: ICD-10-CM

## 2025-06-16 DIAGNOSIS — B18.1 CHRONIC HEPATITIS B: ICD-10-CM

## 2025-06-16 DIAGNOSIS — K82.4 GALLBLADDER POLYP: ICD-10-CM

## 2025-06-16 DIAGNOSIS — R63.4 WEIGHT LOSS: ICD-10-CM

## 2025-06-16 DIAGNOSIS — R93.89 ABNORMAL COMPUTED TOMOGRAPHY OF NECK: ICD-10-CM

## 2025-06-16 DIAGNOSIS — R13.12 OROPHARYNGEAL DYSPHAGIA: ICD-10-CM

## 2025-06-16 PROBLEM — 71457002: Status: ACTIVE | Noted: 2025-06-16

## 2025-06-16 PROCEDURE — 99214 OFFICE O/P EST MOD 30 MIN: CPT | Performed by: INTERNAL MEDICINE

## 2025-06-16 RX ORDER — FAMOTIDINE 20 MG/1
1 TABLET AT BEDTIME AS NEEDED TABLET, FILM COATED ORAL TWICE A DAY
Qty: 60 TABLET | Refills: 3 | OUTPATIENT
Start: 2025-06-16

## 2025-06-16 RX ORDER — TENOFOVIR DISOPROXIL FUMARATE 300 MG/1
TAKE 1 TABLET (300 MG) BY ORAL ROUTE ONCE DAILY WITH A MEAL FOR 90 DAYS TABLET ORAL 1
Qty: 90 | Refills: 3 | Status: ACTIVE | COMMUNITY

## 2025-06-16 RX ORDER — TAMSULOSIN HYDROCHLORIDE 0.4 MG/1
TAKE 1 CAPSULE (0.4 MG) BY ORAL ROUTE ONCE DAILY 1/2 HOUR FOLLOWING THE SAME MEAL EACH DAY CAPSULE ORAL 1
Qty: 0 | Refills: 0 | Status: ON HOLD | COMMUNITY
Start: 1900-01-01

## 2025-06-16 RX ORDER — TENOFOVIR DISOPROXIL FUMARATE 300 MG/1
TAKE 1 TABLET (300 MG) BY ORAL ROUTE ONCE DAILY WITH A MEAL FOR 90 DAYS TABLET ORAL 1
Qty: 90 | Refills: 3
Start: 2025-06-16

## 2025-06-16 RX ORDER — DAPAGLIFLOZIN 10 MG/1
1 TABLET TABLET, FILM COATED ORAL ONCE A DAY
Status: ACTIVE | COMMUNITY

## 2025-06-16 NOTE — HPI-TODAY'S VISIT:
Patient was referred by Dr. Mccloud  A copy of this document will be sent to the physician.  Chris Blank is a 79-year-old male who presents with multiple ongoing health concerns. He reports chronic sinus problems, characterized by a significant amount of phlegm. He has been prescribed Levofloxacin for this condition by Dr. Hooper, an ENT specialist. Despite treatment, he continues to experience sinus-related issues. Additionally, Chris experiences daily reflux, which has been persistent and problematic. He has tried various medications, including Omeprazole and Pantoprazole, but has not found relief. The doctor plans to switch his medication to Pepcid (Famotidine) to manage his symptoms better.  Chris also reports difficulty swallowing, primarily with solid foods. This issue has been associated with weight loss, which is concerning. He has been advised to consume nutritional supplements like Ensure to help maintain his weight and improve his nutrition. A CT scan revealed abnormal mucosal enhancement in the esophagus, raising concerns about potential inflammation or malignancy. Further evaluation with an endoscopy is planned to assess the esophagus more thoroughly.  Additionally, Chris has chronic renal insufficiency, which is being monitored by nephrology. His recent blood work, including B12 levels, hepatitis B DNA, and tumor markers, returned normal results. He has a history of hepatitis B, which is currently stable. The doctor plans to continue monitoring his kidney function and liver health, with potential imaging studies like a CAT scan if necessary.      - Presents for review of blood test results - History of chronic hepatitis B, Tenofivir  therapy as approved by nephrology - Unable to undergo MRI due to metal in shoulder Objective: - Weight: Notable recent weight loss observed - Physical examination: Performed respiratory examination Labs (12/2024): Creatinine: 1.53  Glucose: 67 (fasting) - low Hemoglobin: 12.0 - low  Hematocrit: 38.2 MCV: 102  Liver function tests: Within normal limits Imaging: - Ultrasound (03/30/25 ): Shows benign cyst and stable 3mm polyp

## 2025-06-19 ENCOUNTER — TELEPHONE ENCOUNTER (OUTPATIENT)
Dept: URBAN - METROPOLITAN AREA CLINIC 6 | Facility: CLINIC | Age: 80
End: 2025-06-19

## 2025-06-27 ENCOUNTER — TELEPHONE ENCOUNTER (OUTPATIENT)
Dept: URBAN - METROPOLITAN AREA CLINIC 78 | Facility: CLINIC | Age: 80
End: 2025-06-27